# Patient Record
Sex: FEMALE | Race: WHITE
[De-identification: names, ages, dates, MRNs, and addresses within clinical notes are randomized per-mention and may not be internally consistent; named-entity substitution may affect disease eponyms.]

---

## 2021-10-11 ENCOUNTER — HOSPITAL ENCOUNTER (EMERGENCY)
Dept: HOSPITAL 63 - ER | Age: 61
LOS: 1 days | Discharge: HOME | End: 2021-10-12
Payer: MEDICARE

## 2021-10-11 VITALS — WEIGHT: 200.62 LBS | BODY MASS INDEX: 32.24 KG/M2 | HEIGHT: 66 IN

## 2021-10-11 DIAGNOSIS — F31.9: ICD-10-CM
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
## 2021-10-11 DIAGNOSIS — S91.011A: Primary | ICD-10-CM

## 2021-10-11 DIAGNOSIS — I10: ICD-10-CM
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
## 2021-10-11 DIAGNOSIS — F17.200: ICD-10-CM
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
## 2021-10-11 DIAGNOSIS — F41.9: ICD-10-CM

## 2021-10-11 PROCEDURE — 99283 EMERGENCY DEPT VISIT LOW MDM: CPT

## 2021-10-11 PROCEDURE — 73610 X-RAY EXAM OF ANKLE: CPT

## 2021-10-11 NOTE — PHYS DOC
Past History


Past Medical History:  Anxiety, Bipolar, Hypertension


 (MEJIA CASTILLO)


Past Surgical History:  Hysterectomy


 (MEJIA CASTILLO)


Smoking:  Less than 1pk/day


Alcohol Use:  None


Drug Use:  None


 (MEJIA CASTILLO)





General Adult


HPI:


HPI:


Patient is a 60-year-old female who presents to the emergency department for a 

laceration to her lateral right ankle.  Patient reports that 4 days ago she was 

walking her dog and she tripped and fell onto a piece of glass.  Patient rates 

her pain 9 out of 10.  She denies any decreased range of motion, decreased 

sensation,  inability to bear weight or ambulate, fevers.  She states that she 

has been taking naproxen at home for her pain.  Patient is not a diabetic.


 (MEJIA CASTILLO)





Review of Systems:


Review of Systems:


Constitutional: See HPI


Musculoskeletal: See HPI


Integument: See HPI


Neurologic: See HPI


Psychiatric: History of depression and anxiety


 (MEJIA CASTILLO)





Allergies:


Allergies:





Allergies








Coded Allergies Type Severity Reaction Last Updated Verified


 


  No Known Drug Allergies    8/26/14 No








 (MEJIA CASTILLO)





Physical Exam:


PE:





Constitutional: Well developed, well nourished, no acute distress, non-toxic 

appearance. []


HENT: Normocephalic, atraumatic


Eyes: PERRL, EOMI, conjunctiva normal, no discharge. [] 


Neck: Normal range of motion, no stridor


Cardiovascular: Normal peripheral perfusion


Lungs & Thorax: Normal work of breathing, no tachypnea


Abdomen: Soft


Skin: Warm, dry, no rash, 1 cm circular area of redness noted to patient's right

 lateral ankle without any streaking up the leg


Back: Normal range of motion


Extremities: No tenderness, no cyanosis, no clubbing, ROM intact, no edema.  

Right ankle: Range of motion intact, no obvious deformity, no swelling, neuro 

intact, centimeter area of redness surrounding the lesion noted to the lateral 

aspect of patient's right ankle


Neurologic: Alert and oriented X 3, normal motor function, normal sensory 

function, no focal deficits noted. []


Psychologic: Affect normal, judgement normal, mood normal. []


 (MEJIA CASTILLO)





EKG:


EKG:


[]


 (MEJIA CASTILLO)





Radiology/Procedures:


Radiology/Procedures:


[]PROCEDURE: ANKLE RIGHT 3V





XR EXAM OF ANKLE_RIGHT 3VIEWS





History: Reason: laceration r. lateral side of ankle with glass, pain / Spl. 

Instructions:  / History: 





Technique: 3 views right ankle





Comparison: None.





Findings:


Ankle soft tissue swelling PROMINENT laterally. No dislocation. No acute 

fracture. Plantar calcaneal spur. Mild midfoot DJD.





Impression: 


1.  No acute osseous abnormality.


2.  Ankle soft tissue swelling.





Electronically signed by: Navi Trevizo DO (10/11/2021 11:59 PM) [facility]














DICTATED AND SIGNED BY:     NAVI TREVIZO DO


DATE:     10/11/21 0507





CC: DEVANTE CRANE MD; LYNNE VENTURA MD; MEJIA CASTILLO ~MTH0 0


 (MEJIA CASTILLO)





Heart Score:


C/O Chest Pain:  N/A


Risk Factors:


Risk Factors:  DM, Current or recent (<one month) smoker, HTN, HLP, family 

history of CAD, obesity.


Risk Scores:


Score 0 - 3:  2.5% MACE over next 6 weeks - Discharge Home


Score 4 - 6:  20.3% MACE over next 6 weeks - Admit for Clinical Observation


Score 7 - 10:  72.7% MACE over next 6 weeks - Early Invasive Strategies


 (MEJIA CASTILLO)





Course & Med Decision Making:


Course & Med Decision Making


Pertinent Labs and Imaging studies reviewed. (See chart for details)





[] Patient is a 60-year-old female being seen in the ER for a laceration to her 

right ankle.  Laceration appears to be well approximated however there is a 1 cm

 area of redness surrounding site without any streaking up the leg.  An x-ray 

was performed to rule out foreign body as patient said she cut her ankle on a 

piece of glass.  The x-ray was negative for any acute findings and foreign 

bodies as read by this NP and physician.  Patient was treated with pain 

medication in the ER.  She will be started on antibiotic and was given first 

dose in the ER.  I discussed with patient all findings and diagnostic testing as

 well as the need to follow-up with PCP for further evaluation and treatment or 

return to the ER if any new or worsening symptoms.  Strict return precautions 

were also discussed at length.  Patient voiced understanding and agreement with 

the plan.  Patient is hemodynamically stable at the time of disposition.


 (MEJIA CASTILLO)





Dragon Disclaimer:


Dragon Disclaimer:


This electronic medical record was generated, in whole or in part, using a voice

 recognition dictation system.


 (MEJIA CASTILLO)





Departure


Departure:


Impression:  


   Primary Impression:  


   Laceration of ankle


   Qualified Codes:  S91.011A - Laceration without foreign body, right ankle, 

   initial encounter


Disposition:  01 HOME / SELF CARE / HOMELESS


Condition:  GOOD


Referrals:  


DEVANTE CRANE MD (PCP)


Patient Instructions:  Laceration Care, Adult





Additional Instructions:  


You were seen in the emergency department for a laceration to your right ankle. 

The laceration appears to be well approximated there is some surrounding redness

 and is to be treated with an antibiotic.  You were given your first dose in the

 ER.  Ensure that you start and finish the antibiotic completely.  He can take 

Tylenol or ibuprofen for pain at home.  Continue to change her dressings as 

needed.  Monitor for worsening of your infection such as redness, warmth, 

swelling or drainage.  Follow-up with your primary care provider tomorrow 

regarding your ER visit.  If you develop any of the signs of infection, 

inability to bear weight or ambulate, decreased range of motion or increased 

swelling to your ankle, high fevers refractory to treatment, intractable nausea 

or vomiting please return to the emergency department.





EMERGENCY DEPARTMENT GENERAL DISCHARGE INSTRUCTIONS





Thank you for coming to [facility] (ED) today and trusting us

 with you 


care.  We trust that you had a positivie experience in our Emergency Department.

  If you 


wish to speak to the department management, you may call the director at 

[phone].





YOUR FOLLOW UP INSTRUCTIONS ARE AS FOLLOWS:





1.  Do you have a private Doctor?  If you do not have a private doctor, please 

ask for a 


resource list of physicians or clinics that may be able to assist you with 

follow up care.





2.  The Emergency Physician has interpreted your x-rays.  The X-Ray specialist 

will also 


review them.  If there is a change in the findings, you will be notified in 48 

hours when at 


all possible.





3.  A lab test or culture has been done, your results will be reviewed and you 

will be 


notified if you need a change in treatment.





ADDITIONAL INSTRUCTIONS AND INFORMATION:





1.  Your care today has been supervised by a physician who is specially trained 

in emergency 


care.  Many problems require more than one evaluation for a complete diagnosis 

and 


treatment.  We recommend that you schedule your follow up appointment as 

recommended to 


ensure complete treatment of you illness or injury.  If you are unable to obtain

 follow up 


care and continue to have a problem, or if your condition worsens, we recommend 

that you 


return to the ED.





2.  We are not able to safely determine your condition over the phone nor are we

 able to 


give sound medical advice over the phone.  For these safety reasons, if you call

 for medical 


advice we will ask you to come to the ED for further evaluation.





3.  If you have any questions regarding these discharge instructions please call

 the ED at 


[phone].





SAFETY INFORMATION:





In the interest of safety, wellness, and injury prevention; we encourage you to 

wear your 


sealbelt, if you smoke; quite smoking, and we encourage family to use a 

protective helmet 


for bicycling and other sporting events that present an increased risk for head 

injury.





IF YOUR SYMPTOMS WORSEN OR NEW SYMPTOMS DEVELOP, OR YOU HAVE CONCERNS ABOUT YOUR

 CONDITION; 


OR IF YOUR CONDITION WORSENS WHILE YOU ARE WAITING FOR YOUR FOLLOW UP 

APPOINTMENT; EITHER 


CONTACT YOUR PRIMARY CARE DOCTOR, THE PHYSICIAN WHOSE NAME AND NUMBER YOU WERE 

GIVEN, OR 


RETURN TO THE ED IMMEDIATELY.


Scripts


Hydrocodone Bit/Acetaminophen (HYDROCODONE-APAP 5-325  **) 1 Each Tablet


1 TAB PO PRN Q6HRS PRN for PAIN for 1 Day, #4 TAB 0 Refills


   Prov: MEJIA CASTILLO         10/12/21 


Cephalexin (CEPHALEXIN) 500 Mg Tablet


1 TAB PO QID for infection for 7 Days, #28 TAB 0 Refills


   Prov: MEJIA CASTILLO         10/11/21





Attending Signature


Attending Signature


I have participated in the care of this patient and I have reviewed and agree 

with all pertinent clinical information above including history, exam, and 

recommendations.





 (LYNNE VENTURA MD)











MEJIA CASTILLO          Oct 11, 2021 23:31


LYNNE VENTURA MD           Oct 12, 2021 00:38

## 2021-10-12 VITALS — SYSTOLIC BLOOD PRESSURE: 144 MMHG | DIASTOLIC BLOOD PRESSURE: 94 MMHG

## 2021-10-12 NOTE — RAD
XR EXAM OF ANKLE_RIGHT 3VIEWS



History: Reason: laceration r. lateral side of ankle with glass, pain / Spl. Instructions:  / History
: 



Technique: 3 views right ankle



Comparison: None.



Findings:

Ankle soft tissue swelling PROMINENT laterally. No dislocation. No acute fracture. Plantar calcaneal 
spur. Mild midfoot DJD.



Impression: 

1.  No acute osseous abnormality.

2.  Ankle soft tissue swelling.



Electronically signed by: Navi Trevizo DO (10/11/2021 11:59 PM) [facility]